# Patient Record
Sex: MALE | Race: BLACK OR AFRICAN AMERICAN | Employment: STUDENT | URBAN - METROPOLITAN AREA
[De-identification: names, ages, dates, MRNs, and addresses within clinical notes are randomized per-mention and may not be internally consistent; named-entity substitution may affect disease eponyms.]

---
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
## 2025-02-17 ENCOUNTER — HOSPITAL ENCOUNTER (EMERGENCY)
Age: 25
Discharge: HOME | End: 2025-02-18
Attending: FAMILY MEDICINE
Payer: COMMERCIAL

## 2025-02-17 VITALS
TEMPERATURE: 98 F | HEART RATE: 95 BPM | OXYGEN SATURATION: 100 % | WEIGHT: 165 LBS | DIASTOLIC BLOOD PRESSURE: 67 MMHG | SYSTOLIC BLOOD PRESSURE: 128 MMHG | BODY MASS INDEX: 23.68 KG/M2 | RESPIRATION RATE: 20 BRPM

## 2025-02-17 DIAGNOSIS — M79.10 MYALGIA: ICD-10-CM

## 2025-02-17 DIAGNOSIS — J06.9 UPPER RESPIRATORY TRACT INFECTION, UNSPECIFIED TYPE: Primary | ICD-10-CM

## 2025-02-17 PROCEDURE — 99283 EMERGENCY DEPT VISIT LOW MDM: CPT

## 2025-02-17 PROCEDURE — 87636 SARSCOV2 & INF A&B AMP PRB: CPT

## 2025-02-17 RX ORDER — NAPROXEN 250 MG/1
500 TABLET ORAL ONCE
Status: COMPLETED | OUTPATIENT
Start: 2025-02-18 | End: 2025-02-18

## 2025-02-17 ASSESSMENT — PAIN - FUNCTIONAL ASSESSMENT: PAIN_FUNCTIONAL_ASSESSMENT: NONE - DENIES PAIN

## 2025-02-18 LAB
FLUAV RNA RESP QL NAA+PROBE: NOT DETECTED
FLUBV RNA RESP QL NAA+PROBE: NOT DETECTED
SARS-COV-2 RNA RESP QL NAA+PROBE: NOT DETECTED

## 2025-02-18 PROCEDURE — 6370000000 HC RX 637 (ALT 250 FOR IP): Performed by: FAMILY MEDICINE

## 2025-02-18 RX ADMIN — NAPROXEN 500 MG: 250 TABLET ORAL at 00:06

## 2025-02-18 NOTE — ED TRIAGE NOTES
Pt to ED from home with c/o flu-like symptoms. Pt states hen he woke up today, he has been feeling chills, nausea without emesis, fatigue, body aches, cough and congestion. Pt states he has not taken any ibuprofen or tylenol. Even and unlabored respirations. Call light I reach.

## 2025-02-18 NOTE — ED PROVIDER NOTES
EMERGENCY DEPARTMENT ENCOUNTER     CHIEF COMPLAINT   Chief Complaint   Patient presents with    flu-like symptoms        HPI   Nikolas Chen is a 24 y.o. male who presents with upper respiratory symptoms including myalgia and congestion, onset was today. The duration has been constant since the onset. The patient has associated subjective chills. There are no alleviating factors. The context is that the symptoms started spontaneously, without any known precipitants.     REVIEW OF SYSTEMS   ENT: +sore throat  Pulmonary: +cough and rhinorrrhea  GI: No nausea, vomiting or diarrhea  General: Subjective fevers   All other review of systems are reviewed and are otherwise negative.     PAST MEDICAL & SURGICAL HISTORY   History reviewed. No pertinent past medical history.   History reviewed. No pertinent surgical history.     CURRENT MEDICATIONS        ALLERGIES   No Known Allergies     SOCIAL AND FAMILY HISTORY   Social History     Socioeconomic History    Marital status: Single     Spouse name: None    Number of children: None    Years of education: None    Highest education level: None   Tobacco Use    Smoking status: Never   Substance and Sexual Activity    Alcohol use: No    Drug use: No    Sexual activity: Never      History reviewed. No pertinent family history.     I have reviewed and agreed with all nursing notes.  I have also reviewed patient's social, medical and surgical histories.      PHYSICAL EXAM   VITAL SIGNS: /67   Pulse 95   Temp 98 °F (36.7 °C) (Oral)   Resp 20   Wt 74.8 kg (165 lb)   SpO2 100%   BMI 23.68 kg/m²    Constitutional: Well developed, well nourished   Eyes: Sclera nonicteric, conjunctiva moist   HENT: Atraumatic, nose normal, normal oropharynx, no trismus, airway patent  Respiratory: No retractions, no accessory muscle use, normal bilateral breath sounds noted.  Cardiovascular: Tachycardic rate, normal rhythm, no murmurs   Integument: No rash, dry skin.  Neurologic: Alert &

## 2025-02-18 NOTE — DISCHARGE INSTRUCTIONS
WE TESTED YOU FOR FLU AND COVID, WHICH WERE NEGATIVE. HOWEVER, YOU LIKELY HAS A VIRAL ILLNESS. PUSH FLUIDS. TAKE 600MG OF IBUPROFEN AROUND THE CLOCK FOR YOUR SYMPTOMS.    RETURN IF WORSE.